# Patient Record
Sex: FEMALE | Race: WHITE | NOT HISPANIC OR LATINO | Employment: FULL TIME | URBAN - METROPOLITAN AREA
[De-identification: names, ages, dates, MRNs, and addresses within clinical notes are randomized per-mention and may not be internally consistent; named-entity substitution may affect disease eponyms.]

---

## 2024-03-19 ENCOUNTER — OFFICE VISIT (OUTPATIENT)
Dept: URGENT CARE | Facility: CLINIC | Age: 59
End: 2024-03-19
Payer: COMMERCIAL

## 2024-03-19 VITALS
TEMPERATURE: 98 F | WEIGHT: 210 LBS | HEIGHT: 66 IN | RESPIRATION RATE: 16 BRPM | HEART RATE: 106 BPM | SYSTOLIC BLOOD PRESSURE: 139 MMHG | DIASTOLIC BLOOD PRESSURE: 93 MMHG | BODY MASS INDEX: 33.75 KG/M2 | OXYGEN SATURATION: 98 %

## 2024-03-19 DIAGNOSIS — T23.262A PARTIAL THICKNESS BURN OF BACK OF LEFT HAND, INITIAL ENCOUNTER: ICD-10-CM

## 2024-03-19 DIAGNOSIS — T23.222A PARTIAL THICKNESS BURN OF FINGER OF LEFT HAND, INITIAL ENCOUNTER: Primary | ICD-10-CM

## 2024-03-19 PROBLEM — Z87.19 H/O GASTRITIS: Status: ACTIVE | Noted: 2024-03-19

## 2024-03-19 PROBLEM — I10 HYPERTENSION: Status: ACTIVE | Noted: 2024-03-19

## 2024-03-19 PROCEDURE — 99212 OFFICE O/P EST SF 10 MIN: CPT | Performed by: PHYSICIAN ASSISTANT

## 2024-03-19 RX ORDER — OMEPRAZOLE 10 MG/1
10 CAPSULE, DELAYED RELEASE ORAL
COMMUNITY

## 2024-03-19 RX ORDER — MELOXICAM 7.5 MG/1
7.5 TABLET ORAL DAILY
COMMUNITY

## 2024-03-19 RX ORDER — LOSARTAN POTASSIUM 25 MG/1
25 TABLET ORAL DAILY
COMMUNITY

## 2024-03-19 RX ORDER — BACITRACIN 500 [USP'U]/G
OINTMENT TOPICAL 3 TIMES DAILY
Qty: 28 G | Refills: 0 | Status: SHIPPED | OUTPATIENT
Start: 2024-03-19 | End: 2024-03-29

## 2024-03-19 RX ORDER — VITAMIN E 268 MG
400 CAPSULE ORAL DAILY
Qty: 30 CAPSULE | Refills: 0 | Status: SHIPPED | OUTPATIENT
Start: 2024-03-19 | End: 2024-04-18

## 2024-03-19 RX ORDER — ASCORBIC ACID 250 MG
250 TABLET ORAL DAILY
Qty: 30 TABLET | Refills: 0 | Status: SHIPPED | OUTPATIENT
Start: 2024-03-19 | End: 2024-04-18

## 2024-03-19 NOTE — PROGRESS NOTES
[facility] URG[facility] [place] NOTE:      Name: Venice Chase, 58 y.o.    CSN:[id number]   MRN:[id number]    PCP: Ale Delarosa, PETER-CNP    ALL:  No Known Allergies    History:    Chief Complaint: Hand Burn (BURN ON LEFT HAND, PT WENT TO GRAB A CURLING IRON.)    Encounter Date: 3/19/2024  10:36hrs    HPI: The history was obtained from the patient. Venice is a 58 y.o. female, who presents with a chief complaint of Hand Burn (BURN ON LEFT HAND, PT WENT TO GRAB A CURLING IRON.)     As above and tempted to try and catch a curling iron that she dropped with her left hand and now she suffered a partial degree secondary burn to the left thumb, left index and middle finger.  She was at work and told to come for an evaluation and was encouraged to seek a silver Silvadene by the school nurse.  Patient has moderate pain, she denies any notable paresthesias, she is having her hand ran under a cool tap water at the moment with a cold rag.    Tdap is up-to-date.    PMHx:    Past Medical History:   Diagnosis Date    H/O gastritis     Hypertension               Current Outpatient Medications   Medication Sig Dispense Refill    alpha tocopherol (Vitamin E) 268 mg (400 unit) capsule Take 1 capsule (400 Units) by mouth once daily. 30 capsule 0    ascorbic acid (Vitamin C) 250 mg tablet Take 1 tablet (250 mg) by mouth once daily. 30 tablet 0    bacitracin 500 unit/gram ointment Apply topically 3 times a day for 10 days. Apply to affected area daily 28 g 0    losartan (Cozaar) 25 mg tablet Take 1 tablet (25 mg) by mouth once daily.      meloxicam (Mobic) 7.5 mg tablet Take 1 tablet (7.5 mg) by mouth once daily.      omeprazole (PriLOSEC) 10 mg DR capsule Take 1 capsule (10 mg) by mouth. Do not crush or chew.       No current facility-administered medications for this visit.         PMSx:    Past Surgical History:   Procedure Laterality Date    COLONOSCOPY      ESOPHAGOGASTRODUODENOSCOPY      TUBAL LIGATION            Fam Hx:   Her family history includes Atrial fibrillation in her mother; Gallbladder disease in her mother; Lung cancer in her father.     SOC. Hx:     Social History     Socioeconomic History    Marital status:      Spouse name: Not on file    Number of children: Not on file    Years of education: Not on file    Highest education level: Not on file   Occupational History    Not on file   Tobacco Use    Smoking status: Former     Types: Cigarettes    Smokeless tobacco: Never   Vaping Use    Vaping Use: Unknown   Substance and Sexual Activity    Alcohol use: Not Currently    Drug use: Defer    Sexual activity: Not Currently   Other Topics Concern    Not on file   Social History Narrative    Not on file     Social Determinants of Health     Financial Resource Strain: Not on file   Food Insecurity: Not on file   Transportation Needs: Not on file   Physical Activity: Not on file   Stress: Not on file   Social Connections: Not on file   Intimate Partner Violence: Not on file   Housing Stability: Not on file         Vitals:    03/19/24 1026   BP: (!) 139/93   Pulse: 106   Resp: 16   Temp: 36.7 °C (98 °F)   SpO2: 98%     95.3 kg (210 lb)          Physical Exam  Constitutional:       Appearance: Normal appearance. She is normal weight.   Musculoskeletal:      Cervical back: Normal range of motion.   Skin:     Capillary Refill: Capillary refill takes less than 2 seconds.      Findings: Burn present.      Comments: Partial second-degree burns noted primarily to the thumb and second finger of left hand, she has a blister formation on the palmar aspect of the left thumb as well as left index finger and a thermal appearance to the third finger.  There is no active open wounds, no active bleeding, patient has some erythematous changes throughout the the affected fingers but there is no fusiform blister formation around either finger and described.  Function of hand is still preserved as she is able to make a fist  and squeeze a rag in the sink.    Parent is less than 1% of body surface area.   Neurological:      Mental Status: She is alert.         LABORATORY @ RADIOLOGICAL IMAGING (if done):     No results found for this or any previous visit (from the past 24 hour(s)).    ____________________________________________________________________    I did personally review Venice's past medical history, surgical history, social history, as well as family history (when relevant).  In this case, I also oversaw the her drug management by reviewing her medication list, allergy list, as well as the medications that I prescribed during the UC course and/or recommended as an out-patient (including possible OTC medications such as acetaminophen, NSAIDs , etc).    After reviewing the items above, I did look at previous medical documentation, such as recent hospitalizations, office visits, and/or recent consultations with PCP/specialist.                          SDOH:   Another factor that I considered in Venice's care was her Social Determinants of Health (SDOH). During this UC encounter, she did not have social determinants of health. Those SDOH influencing Venice's care are: none      _____________________________________________________________________      UC COURSE/MEDICAL DECISION MAKING:    Venice is a 58 y.o., who presents with a working diagnosis of   1. Partial thickness burn of finger of left hand, initial encounter    2. Partial thickness burn of back of left hand, initial encounter     with a differential to include: Abrasion, burn, foreign body, contusion, cellulitis, erysipelas    Current plan and provided in the office was cleansing with chlorhexidine soap, applications of bacitracin to the affected fingers, gentle nonadherent dressings with Kerlix, supplies were provided, discussed wound care and management at home and when to seek reevaluation also encouraged to call if symptoms or exacerbated or quite uncomfortable as I  can prescribe additional oral analgesic medications.  She was under the impression from recommendations made by school nurse use of  silver sulfadiazine cream was necessary for treatment & pain control, but in my experience bacitracin is what is preferred at the burn units, and she was given supplements of vitamin E and vitamin C to promote healing and to reduce possible CRPS (albeit less likely), respectively        Josias Maya PA-C   Advanced Practice Provider  [facility]

## 2024-03-19 NOTE — LETTER
March 19, 2024     Patient: Venice Chase   YOB: 1965   Date of Visit: 3/19/2024       To Whom It May Concern:    It is my medical opinion that Venice Chase may return to work on 03/20/24 .    If you have any questions or concerns, please don't hesitate to call.         Sincerely,        Josias Maya PA-C    CC: No Recipients